# Patient Record
Sex: MALE | Race: BLACK OR AFRICAN AMERICAN | Employment: OTHER | URBAN - METROPOLITAN AREA
[De-identification: names, ages, dates, MRNs, and addresses within clinical notes are randomized per-mention and may not be internally consistent; named-entity substitution may affect disease eponyms.]

---

## 2020-06-30 NOTE — TELEPHONE ENCOUNTER
MEDICAL RECORDS REQUEST   [facility]  Urology Clinic  [address] [place] [address]  PHONE: [phone]  Fax: [phone]        FUTURE VISIT INFORMATION                                                   Parth Dugan, : 1965 scheduled for future visit at [facility]    APPOINTMENT INFORMATION:    Date: 20 9:30AM    Provider:  Faye Snyder PA-C    Reason for Visit/Diagnosis: Testicle pain     REFERRAL INFORMATION:    Referring provider:  Self    Specialty: N/A    Referring providers clinic:  N/A    Clinic contact number:  N/A    RECORDS REQUESTED FOR VISIT                                                     NOTES  STATUS/DETAILS   OFFICE NOTE from referring provider  no   OFFICE NOTE from other specialist  no   DISCHARGE SUMMARY from hospital  no   DISCHARGE REPORT from the ER  no   OPERATIVE REPORT  no   MEDICATION LIST  yes     PRE-VISIT CHECKLIST      Record collection complete Yes- [facility] recs in CE  CSS fax to have imgs pushed     Appointment appropriately scheduled           (right time/right provider) Yes   MyChart activation If no, please explain: In process    Questionnaire complete If no, please explain: In process      Completed by: Leatha Eldridge

## 2020-07-01 ENCOUNTER — PRE VISIT (OUTPATIENT)
Dept: UROLOGY | Facility: CLINIC | Age: 55
End: 2020-07-01

## 2020-07-01 ENCOUNTER — VIRTUAL VISIT (OUTPATIENT)
Dept: UROLOGY | Facility: CLINIC | Age: 55
End: 2020-07-01
Payer: COMMERCIAL

## 2020-07-01 DIAGNOSIS — N50.82 SCROTAL PAIN: Primary | ICD-10-CM

## 2020-07-01 DIAGNOSIS — Z86.79 HISTORY OF VARICOCELE: ICD-10-CM

## 2020-07-01 RX ORDER — COLCHICINE 0.6 MG/1
TABLET ORAL
COMMUNITY

## 2020-07-01 RX ORDER — ALLOPURINOL 100 MG/1
TABLET ORAL
COMMUNITY
Start: 2020-02-19

## 2020-07-01 RX ORDER — OXYCODONE HYDROCHLORIDE 15 MG/1
TABLET ORAL
COMMUNITY
Start: 2019-02-28 | End: 2020-10-27

## 2020-07-01 RX ORDER — OXYCODONE HYDROCHLORIDE 30 MG/1
30 TABLET ORAL
COMMUNITY
Start: 2019-02-28 | End: 2020-10-27

## 2020-07-01 RX ORDER — LOSARTAN POTASSIUM 50 MG/1
50 TABLET ORAL
COMMUNITY
Start: 2020-04-23

## 2020-07-01 RX ORDER — TRIAMTERENE AND HYDROCHLOROTHIAZIDE 37.5; 25 MG/1; MG/1
CAPSULE ORAL
COMMUNITY

## 2020-07-01 RX ORDER — AMLODIPINE BESYLATE 10 MG/1
TABLET ORAL
COMMUNITY

## 2020-07-01 ASSESSMENT — PAIN SCALES - GENERAL: PAINLEVEL: MODERATE PAIN (5)

## 2020-07-01 NOTE — PATIENT INSTRUCTIONS
UROLOGY CLINIC VISIT PATIENT INSTRUCTIONS    Schedule a scrotal ultrasound next available. Next steps pending results.     If you have any issues, questions or concerns in the meantime, do not hesitate to contact us at [phone] or via [employer].     It was a pleasure meeting with you today.  Thank you for allowing me and my team the privilege of caring for you today.  YOU are the reason we are here, and I truly hope we provided you with the excellent service you deserve.  Please let us know if there is anything else we can do for you so that we can be sure you are leaving completely satisfied with your care experience.

## 2020-07-01 NOTE — LETTER
"7/1/2020       RE: Parth Dugan  [address]  [facility] [address]     Dear Colleague,    Thank you for referring your patient, Parth Dugan, to the [facility] UROLOGY AND INST FOR PROSTATE AND UROLOGIC CANCERS at [facility]. Please see a copy of my visit note below.    Parth Dugan is a 55 year old male who is being evaluated via a billable telephone visit.      The patient has been notified of following:     \"This telephone visit will be conducted via a call between you and your physician/provider. We have found that certain health care needs can be provided without the need for a physical exam.  This service lets us provide the care you need with a short phone conversation.  If a prescription is necessary we can send it directly to your pharmacy.  If lab work is needed we can place an order for that and you can then stop by our lab to have the test done at a later time.    Telephone visits are billed at different rates depending on your insurance coverage. During this emergency period, for some insurers they may be billed the same as an in-person visit.  Please reach out to your insurance provider with any questions.    If during the course of the call the physician/provider feels a telephone visit is not appropriate, you will not be charged for this service.\"    Patient has given verbal consent for Telephone visit?  Yes    What phone number would you like to be contacted at? [phone]    How would you like to obtain your AVS? Mail a copy     CC: scrotal pain    HPI: Mr. Parth Dugan is a very pleasant 55 year old male with PMH significant for gout, HTN, GERD, and chronic back pain who is being evaluated via billable telephone visit in consultation from Dr. Gayle for scrotal pain. He describes a long history of testicular pain since the mid-1980's.   This pain is constant but waxes and wanes in severity and seems to be progressively worsening " "over the past several months. States that he saw a urologist in the 1990's who told him that the pain was due to \"playing sports\" and that there was nothing that could be done. He then saw another outside urologist, Dr. Macdonald, in early 2019 and was diagnosed with bilateral varicoceles. He underwent bilateral varicocele embolization with IR on 5/30/2019. Pain seemed to improve temporarily following this procedure but has recurred. He also has a history of chronic low back pain for which he has previously worked with physical therapy.    He denies any associated urinary symptoms such as dysuria, frequency, urgency, hesitancy, intermittency, needing to strain to void, or sensation of incomplete emptying. No gross hematuria or penile discharge. He denies recent injury or trauma to the groin. Saw his PCP recently who treated for possible prostatitis with a course of Doxycycline which patient could not complete secondary to intolerable side effects. No improvement in testicular pain with antibiotics.       No past medical history on file.   Gout  HTN  Chronic back pain    No past surgical history on file.   Reviewed via Care Everywhere    Hydrochlorothiazide; Iodine; Lisinopril; Indomethacin; and Penicillins  Current Outpatient Medications   Medication     allopurinol (ZYLOPRIM) 100 MG tablet     amLODIPine (NORVASC) 10 MG tablet     colchicine (COLCRYS) 0.6 MG tablet     losartan (COZAAR) 50 MG tablet     omeprazole (PRILOSEC) 20 MG DR capsule     oxyCODONE IR (ROXICODONE) 15 MG tablet     oxyCODONE IR (ROXICODONE) 30 MG tablet     triamterene-HCTZ (DYAZIDE) 37.5-25 MG capsule     No current facility-administered medications for this visit.      Family History: There is no h/o  malignancy.  There is no h/o urolithiasis.     Social History: The patient formerly smoked cigarettes (quit in 2002), no EtOH and no illicit drug use.    ROS:  A comprehensive 10 point review of systems was obtained and was negative except for " that outlined above in the HPI.    Imaging:   EXAMINATION: US TESTICLE  DATE:  3/20/2019 10:03 AM  IMPRESSION:  1.  Left-sided varicocele.  2.  Small right hydrocele.       ASSESSMENT/PLAN:  Mr. Parth Dugan is a 55 year old male with chronic scrotal pain s/p bilateral varicocele embolization with IR in May 2019. He had temporary improvement following embolization but pain has recurred. Also has chronic low back pain, which may or may not be connected to scrotal pain (difficult to assess over the phone without the ability to perform physical exam). We discussed possible etiologies including recurrent varicoceles, myofascial or nerve pain, vs other scrotal pathology. Plan is for the following:  -Obtain updated scrotal ultrasound.  -If he has recurrent varicoceles, consider referral to Dr. Trinidad for discussion of possible varicocelectomy vs other management options.  -If scrotal ultrasound is unremarkable, consider referral to pelvic floor physical therapy and/or pain clinic.   -Encouraged scrotal support, NSAIDS PRN, and avoidance of aggravating activities.    Thank you for the opportunity to participate in the care of this very pleasant patient. I will keep you updated on his progress.    Phone call duration: 9 minutes    Faye Snyder PA-C

## 2020-07-01 NOTE — PROGRESS NOTES
"Parth Dugan is a 55 year old male who is being evaluated via a billable telephone visit.      The patient has been notified of following:     \"This telephone visit will be conducted via a call between you and your physician/provider. We have found that certain health care needs can be provided without the need for a physical exam.  This service lets us provide the care you need with a short phone conversation.  If a prescription is necessary we can send it directly to your pharmacy.  If lab work is needed we can place an order for that and you can then stop by our lab to have the test done at a later time.    Telephone visits are billed at different rates depending on your insurance coverage. During this emergency period, for some insurers they may be billed the same as an in-person visit.  Please reach out to your insurance provider with any questions.    If during the course of the call the physician/provider feels a telephone visit is not appropriate, you will not be charged for this service.\"    Patient has given verbal consent for Telephone visit?  Yes    What phone number would you like to be contacted at? [phone]    How would you like to obtain your AVS? Mail a copy     CC: scrotal pain    HPI: Mr. Parth Dugan is a very pleasant 55 year old male with PMH significant for gout, HTN, GERD, and chronic back pain who is being evaluated via billable telephone visit in consultation from Dr. Gayle for scrotal pain. He describes a long history of testicular pain since the mid-1980's. This pain is constant but waxes and wanes in severity and seems to be progressively worsening over the past several months. States that he saw a urologist in the 1990's who told him that the pain was due to \"playing sports\" and that there was nothing that could be done. He then saw another outside urologist, Dr. Macdonald, in early 2019 and was diagnosed with bilateral varicoceles. He underwent bilateral varicocele " embolization with IR on 5/30/2019. Pain seemed to improve temporarily following this procedure but has recurred. He also has a history of chronic low back pain for which he has previously worked with physical therapy.    He denies any associated urinary symptoms such as dysuria, frequency, urgency, hesitancy, intermittency, needing to strain to void, or sensation of incomplete emptying. No gross hematuria or penile discharge. He denies recent injury or trauma to the groin. Saw his PCP recently who treated for possible prostatitis with a course of Doxycycline which patient could not complete secondary to intolerable side effects. No improvement in testicular pain with antibiotics.       No past medical history on file.   Gout  HTN  Chronic back pain    No past surgical history on file.   Reviewed via Care Everywhere    Hydrochlorothiazide; Iodine; Lisinopril; Indomethacin; and Penicillins  Current Outpatient Medications   Medication     allopurinol (ZYLOPRIM) 100 MG tablet     amLODIPine (NORVASC) 10 MG tablet     colchicine (COLCRYS) 0.6 MG tablet     losartan (COZAAR) 50 MG tablet     omeprazole (PRILOSEC) 20 MG DR capsule     oxyCODONE IR (ROXICODONE) 15 MG tablet     oxyCODONE IR (ROXICODONE) 30 MG tablet     triamterene-HCTZ (DYAZIDE) 37.5-25 MG capsule     No current facility-administered medications for this visit.      Family History: There is no h/o  malignancy.  There is no h/o urolithiasis.     Social History: The patient formerly smoked cigarettes (quit in 2002), no EtOH and no illicit drug use.    ROS:  A comprehensive 10 point review of systems was obtained and was negative except for that outlined above in the HPI.    Imaging:   EXAMINATION: US TESTICLE  DATE:  3/20/2019 10:03 AM  IMPRESSION:  1.  Left-sided varicocele.  2.  Small right hydrocele.       ASSESSMENT/PLAN:  Mr. Parth Dugan is a 55 year old male with chronic scrotal pain s/p bilateral varicocele embolization with IR in May 2019. He  had temporary improvement following embolization but pain has recurred. Also has chronic low back pain, which may or may not be connected to scrotal pain (difficult to assess over the phone without the ability to perform physical exam). We discussed possible etiologies including recurrent varicoceles, myofascial or nerve pain, vs other scrotal pathology. Plan is for the following:  -Obtain updated scrotal ultrasound.  -If he has recurrent varicoceles, consider referral to Dr. Trinidad for discussion of possible varicocelectomy vs other management options.  -If scrotal ultrasound is unremarkable, consider referral to pelvic floor physical therapy and/or pain clinic.   -Encouraged scrotal support, NSAIDS PRN, and avoidance of aggravating activities.    Thank you for the opportunity to participate in the care of this very pleasant patient. I will keep you updated on his progress.    Phone call duration: 9 minutes    Faye Snyder PA-C

## 2020-08-24 ENCOUNTER — ANCILLARY PROCEDURE (OUTPATIENT)
Dept: ULTRASOUND IMAGING | Facility: CLINIC | Age: 55
End: 2020-08-24
Attending: PHYSICIAN ASSISTANT
Payer: COMMERCIAL

## 2020-08-24 DIAGNOSIS — N50.82 SCROTAL PAIN: ICD-10-CM

## 2020-08-26 ENCOUNTER — TELEPHONE (OUTPATIENT)
Dept: UROLOGY | Facility: CLINIC | Age: 55
End: 2020-08-26

## 2020-08-26 NOTE — TELEPHONE ENCOUNTER
----- Message from Faye Snyder PA-C sent at 8/25/2020  7:44 AM CDT -----  Please contact patient to schedule next available in person with Dr. Trinidad to discuss scrotal ultrasound results and treatment options for his testicular pain/varicoceles.   Thanks!   Faye Snyder PA-C

## 2020-09-10 ENCOUNTER — PRE VISIT (OUTPATIENT)
Dept: UROLOGY | Facility: CLINIC | Age: 55
End: 2020-09-10

## 2020-09-10 NOTE — TELEPHONE ENCOUNTER
Reason for Visit: Consult    Diagnosis: Varicocele    Orders/Procedures/Records: in system    Contact Patient: n/a    Rooming Requirements: normal      Lydia Nickerson LPN  09/10/20  8:39 AM

## 2020-09-18 ENCOUNTER — OFFICE VISIT (OUTPATIENT)
Dept: UROLOGY | Facility: CLINIC | Age: 55
End: 2020-09-18
Payer: COMMERCIAL

## 2020-09-18 VITALS — HEART RATE: 63 BPM | SYSTOLIC BLOOD PRESSURE: 150 MMHG | DIASTOLIC BLOOD PRESSURE: 99 MMHG

## 2020-09-18 DIAGNOSIS — N50.819 PAIN OF TESTES: ICD-10-CM

## 2020-09-18 DIAGNOSIS — Z11.59 ENCOUNTER FOR SCREENING FOR OTHER VIRAL DISEASES: Primary | ICD-10-CM

## 2020-09-18 DIAGNOSIS — N50.819 PAIN OF TESTES: Primary | ICD-10-CM

## 2020-09-18 DIAGNOSIS — I86.1 BILATERAL VARICOCELES: ICD-10-CM

## 2020-09-18 DIAGNOSIS — R39.198 SLOW URINARY STREAM: Primary | ICD-10-CM

## 2020-09-18 PROBLEM — M1A.09X0 IDIOPATHIC CHRONIC GOUT OF MULTIPLE SITES WITHOUT TOPHUS: Status: ACTIVE | Noted: 2020-02-12

## 2020-09-18 PROBLEM — M1A.40X1: Status: ACTIVE | Noted: 2020-09-18

## 2020-09-18 PROBLEM — G89.4 CHRONIC PAIN DISORDER: Status: ACTIVE | Noted: 2017-09-30

## 2020-09-18 PROBLEM — M94.262 CHONDROMALACIA OF LEFT KNEE: Status: ACTIVE | Noted: 2018-03-14

## 2020-09-18 RX ORDER — KETOCONAZOLE 20 MG/G
CREAM TOPICAL
COMMUNITY
Start: 2020-01-09

## 2020-09-18 RX ORDER — CLINDAMYCIN PHOSPHATE 900 MG/50ML
900 INJECTION, SOLUTION INTRAVENOUS SEE ADMIN INSTRUCTIONS
Status: CANCELLED | OUTPATIENT
Start: 2020-09-18

## 2020-09-18 RX ORDER — LIDOCAINE 50 MG/G
OINTMENT TOPICAL
COMMUNITY
Start: 2020-07-02

## 2020-09-18 RX ORDER — HYDROCORTISONE 2.5 %
CREAM (GRAM) TOPICAL
COMMUNITY
Start: 2019-12-11

## 2020-09-18 RX ORDER — OXYCODONE HYDROCHLORIDE 30 MG/1
TABLET ORAL
COMMUNITY
Start: 2020-08-31 | End: 2020-09-30

## 2020-09-18 RX ORDER — MULTIVITAMIN
1 TABLET ORAL
COMMUNITY

## 2020-09-18 RX ORDER — NALOXONE HYDROCHLORIDE 4 MG/.1ML
SPRAY NASAL
COMMUNITY
Start: 2020-04-01

## 2020-09-18 RX ORDER — CLINDAMYCIN PHOSPHATE 900 MG/50ML
900 INJECTION, SOLUTION INTRAVENOUS
Status: CANCELLED | OUTPATIENT
Start: 2020-09-18

## 2020-09-18 RX ORDER — OXYCODONE HYDROCHLORIDE 5 MG/1
TABLET ORAL
COMMUNITY
Start: 2020-07-19 | End: 2020-10-27

## 2020-09-18 RX ORDER — ATORVASTATIN CALCIUM 20 MG/1
20 TABLET, FILM COATED ORAL DAILY
COMMUNITY
Start: 2020-09-05

## 2020-09-18 RX ORDER — LIDOCAINE 50 MG/G
OINTMENT TOPICAL EVERY 12 HOURS
COMMUNITY
Start: 2020-07-29 | End: 2021-01-24

## 2020-09-18 RX ORDER — TAMSULOSIN HYDROCHLORIDE 0.4 MG/1
0.4 CAPSULE ORAL DAILY
Qty: 90 CAPSULE | Refills: 3 | Status: SHIPPED | OUTPATIENT
Start: 2020-09-18 | End: 2021-04-22

## 2020-09-18 NOTE — PROGRESS NOTES
Parth Dugan is a 55 year old male with history of bilateral testis pain.  Had varicocele embolization 5/30/19 at [facility].  This helped at first but now pain is worse than it was before surgery.  Previous prostate and STI checks have been normal.    Pain used to get better supine or in the AM, now there is no comfortable position.  Complains of sharp scrotal pain.      Scrotal ultrasound 8/24/20 showed persisting bilateral varicoceles.    History of enlarged prostate and has tried antibiotics for prostatitis.  History of Low back pain.    Occasionally will be walking and will buckle over with scrotal pain.    H/o DJD low back- history of bulging disc, etc. he takes oxycodone for his back pain regularly.    Complains of frequent voiding. Taking diuretic.    No history of vasectomy  Scrotal ultrasound 8/24/20 reviewed by me- images and report.  + for bilateral varicoceles..      REVIEW OF SYSTEMS:  General: negative  Skin: negative  Eyes: negative  Ears/Nose/Throat: negative  Respiratory: negative  Cardiovascular: negative  Gastrointestinal: negative  Genitourinary: see HPI  Musculoskeletal: negative  Neurologic: negative  Psychiatric: negative  Hematologic/Lymphatic/Immunologic: negative  Endocrine: negative    PAST MEDICAL HX:  Gout  HTN    PAST SURG HX:  Varicocele embolization 5/2019.  No history of inguinal hernia repair.  Bunion surgery.    FAMILY HX:  No family history on file.    SOCIAL HX:  Social History     Tobacco Use     Smoking status: Former Smoker     Smokeless tobacco: Never Used   Substance Use Topics     Alcohol use: None     Drug use: None       MEDICATIONS:  Current Outpatient Medications   Medication Sig     allopurinol (ZYLOPRIM) 100 MG tablet TAKE 2 TABLETS (200 MG) BY MOUTH ONCE DAILY.     amLODIPine (NORVASC) 10 MG tablet TAKE 1 TABLET (10 MG) BY MOUTH ONCE DAILY.     atorvastatin (LIPITOR) 20 MG tablet Take 20 mg by mouth daily     colchicine (COLCRYS) 0.6 MG tablet 1 tablet      hydrocortisone 2.5 % cream MIX 1:1 WITH KETOCONAZOLE CREAM AND APPLY TO AFFECTED AREAS 2 TIMES A DAY FOR UP TO 14 DAYS     ketoconazole (NIZORAL) 2 % external cream MIX 1:1 WITH HYDROCORTISONE AND APPLY 2 TIMES A DAY TO AFFECTED AREAS FOR UP TO 14 DAYS     lidocaine (XYLOCAINE) 5 % external ointment apply 5 gram by topical route 2 times every day     lidocaine (XYLOCAINE) 5 % external ointment Apply topically every 12 hours     losartan (COZAAR) 50 MG tablet Take 50 mg by mouth     Multiple Vitamin (DAILY VITAMINS) TABS Take 1 tablet by mouth     NARCAN 4 MG/0.1ML nasal spray spray 0.1 milliliter by intranasal route in 1 nostril may repeat dose every 2-3 minutes as needed alternating nostrils with each dose     omeprazole (PRILOSEC) 20 MG DR capsule TAKE 2 CAPSULES BY MOUTH EVERY DAY     oxyCODONE (ROXICODONE) 5 MG tablet      oxyCODONE IR (ROXICODONE) 15 MG tablet (Schedule II Drug) take 1 tablet by oral route  every 4-6 hours as needed for chronic pain, max 2/day     oxyCODONE IR (ROXICODONE) 30 MG tablet take 1 tablet by oral route  every 8 hours for chronic pain     oxyCODONE IR (ROXICODONE) 30 MG tablet Take 30 mg by mouth     triamterene-HCTZ (DYAZIDE) 37.5-25 MG capsule TAKE 1 CAPSULE BY MOUTH EVERY DAY     No current facility-administered medications for this visit.        ALLERGIES:  Hydrochlorothiazide; Iodine; Lisinopril; Amoxicillin; Indomethacin; and Penicillins      GENERAL PHYSICAL EXAM:     BP (!) 150/99   Pulse 63    Constitutional: No acute distress. Well nourished.   PSYCH: normal mood and affect.  NEURO: normal gait, no focal deficits.   EYES: anicteric, EOMI, PERR.  CARDIOPULMONARY: breathing non-labored, pulse regular rate/rhythm, no peripheral edema.  GI: Abdomen soft, non-tender,  Nondistended   MUSCULOSKELETAL: normal limb proportions, no muscle wasting, no contractures.  SKIN: Normal virilized hair distribution, no lesions, warts or rashes over genitalia, abdomen extremities or  face.  HEME/LYMPH: no ecchymosis, no lymphadenopathy in groin, no lymphedema.     EXAM:  Phallus normal   Left testis descended , size is normal  , consistency is normal . No intra-testicular masses.   Right testis descended , size is normal  , consistency is normal . No intra-testicular masses.   Epididymes present, non-tender, not-enlarged.   Left cord: Vas present.  Probable varicocele .  Right cord: Vas present.  Probable varicocele .     Prostate exam: Deferred    Imaging/labs:  Reviewed scrotal ultrasound 8/24/20     ASSESSMENT:     Bilateral testis pain.  I discussed with him pain like this can be multifactorial.  He could be a chronic neuropathic pain that would not get better or could possibly get slightly worse with varicocele surgery.  Varicocele repair seems very reasonable, to eliminate these as a possible source of his pain.  We discussed options to varicocele repair including pain clinic.  He would like to proceed with repairing the varicoceles.    PLAN:    Discussed risks, benefits, and alternatives of varicocele repair, including various methods.  I counseled him extensively on the nature of varicoceles, and their possible effects on pain, fertility, and testosterone production.  I described surgery and embolization approaches, and the detailed risks of surgical repair.  These include damage to artery (ischemia), damage to lymphatics ( hydrocele), as well as risk of recurrence (~1%). Discussed that about 2/3rds of men see improved semen parameters after varicocele repair and that improvement takes at least 3 months to see.  Discussed that varicocele pain typically improves with repair, but in rare cases the testis can become sensitive after a surgical repair.     He would like to schedule bilateral varicocele repair and we will get this scheduled at his convenience      Copied cc to Consulting provider Faye Snyder       Thank-you for the kind consultation.  Kennedy Trinidad MD     Urological  Surgeon     20 min visit, over 50% face to face in counseling/discussion of options for management of testis pain.

## 2020-09-18 NOTE — PROGRESS NOTES
Pre Op Teaching Flowsheet       Pre and Post op Patient Education  Relevant Diagnosis:  Pain of testes and Bilateral varicoceles  Surgical procedure:  BILATERAL VARICOCELE REPAIR   Teaching Topic:  Pre and post op teaching  Person Involved in teaching: Parth Dugan    Motivation Level:  Asks Questions: Yes  Eager to Learn:  Yes  Cooperative: Yes  Receptive (willing/able to accept information):  Yes    Patient demonstrates understanding of the following:  Date of surgery:  10/27/20  Location of surgery:  [facility] [place]- 5th Floor  History and Physical and any other testing necessary prior to surgery: Yes-primary  Required time line for completion of History and Physical and any pre-op testing: Yes    Patient demonstrates understanding of the following:  Pre-op bowel prep:  None  Pre-op showering/scrub information with PCMX Soap: Yes  Blood thinner medications discussed and when to stop (if applicable):  Yes      Infection Prevention:   Patient demonstrates understanding of the following:  Surgical procedure site care taught: at time of discharge  Signs and symptoms of infection taught:  Yes      Post-op follow-up:  Discussed how to contact the hospital, nurse, and clinic scheduling staff if necessary.    Instructional materials used/given/mailed:  [place] [facility] Booklet, post op teaching sheet, Map, Soap, and arrival/location information.    Surgical instructions packet given to patient in office:  Yes.    Patient has a  and someone to stay with him for the first 24 hours.    Patient is aware he needs COVID testing prior to procedure.

## 2020-09-18 NOTE — LETTER
9/18/2020       RE: Parth Dugan  [address]  [facility] [address]     Dear Colleague,    Thank you for referring your patient, Parth Dugan, to the [facility] UROLOGY AND INST FOR PROSTATE AND UROLOGIC CANCERS at [facility]. Please see a copy of my visit note below.    Parth Dugan is a 55 year old male with history of bilateral testis pain.  Had varicocele embolization 5/30/19 at [facility].  This helped at first but now pain is worse than it was before surgery.  Previous prostate and STI checks have been normal.    Pain used to get better supine or in the AM, now there is no comfortable position.  Complains of sharp scrotal pain.      Scrotal ultrasound 8/24/20 showed persisting bilateral varicoceles.    History of enlarged prostate and has tried antibiotics for prostatitis.  History of Low back pain.    Occasionally will be walking and will buckle over with scrotal pain.    H/o DJD low back- history of bulging disc, etc. he takes oxycodone for his back pain regularly.    Complains of frequent voiding. Taking diuretic.    No history of vasectomy  Scrotal ultrasound 8/24/20 reviewed by me- images and report.  + for bilateral varicoceles..      REVIEW OF SYSTEMS:  General: negative  Skin: negative  Eyes: negative  Ears/Nose/Throat: negative  Respiratory: negative  Cardiovascular: negative  Gastrointestinal: negative  Genitourinary: see HPI  Musculoskeletal: negative  Neurologic: negative  Psychiatric: negative  Hematologic/Lymphatic/Immunologic: negative  Endocrine: negative    PAST MEDICAL HX:  Gout  HTN    PAST SURG HX:  Varicocele embolization 5/2019.  No history of inguinal hernia repair.  Bunion surgery.    FAMILY HX:  No family history on file.    SOCIAL HX:  Social History     Tobacco Use     Smoking status: Former Smoker     Smokeless tobacco: Never Used   Substance Use Topics     Alcohol use: None     Drug use: None       MEDICATIONS:  Current  Outpatient Medications   Medication Sig     allopurinol (ZYLOPRIM) 100 MG tablet TAKE 2 TABLETS (200 MG) BY MOUTH ONCE DAILY.     amLODIPine (NORVASC) 10 MG tablet TAKE 1 TABLET (10 MG) BY MOUTH ONCE DAILY.     atorvastatin (LIPITOR) 20 MG tablet Take 20 mg by mouth daily     colchicine (COLCRYS) 0.6 MG tablet 1 tablet     hydrocortisone 2.5 % cream MIX 1:1 WITH KETOCONAZOLE CREAM AND APPLY TO AFFECTED AREAS 2 TIMES A DAY FOR UP TO 14 DAYS     ketoconazole (NIZORAL) 2 % external cream MIX 1:1 WITH HYDROCORTISONE AND APPLY 2 TIMES A DAY TO AFFECTED AREAS FOR UP TO 14 DAYS     lidocaine (XYLOCAINE) 5 % external ointment apply 5 gram by topical route 2 times every day     lidocaine (XYLOCAINE) 5 % external ointment Apply topically every 12 hours     losartan (COZAAR) 50 MG tablet Take 50 mg by mouth     Multiple Vitamin (DAILY VITAMINS) TABS Take 1 tablet by mouth     NARCAN 4 MG/0.1ML nasal spray spray 0.1 milliliter by intranasal route in 1 nostril may repeat dose every 2-3 minutes as needed alternating nostrils with each dose     omeprazole (PRILOSEC) 20 MG DR capsule TAKE 2 CAPSULES BY MOUTH EVERY DAY     oxyCODONE (ROXICODONE) 5 MG tablet      oxyCODONE IR (ROXICODONE) 15 MG tablet (Schedule II Drug) take 1 tablet by oral route  every 4-6 hours as needed for chronic pain, max 2/day     oxyCODONE IR (ROXICODONE) 30 MG tablet take 1 tablet by oral route  every 8 hours for chronic pain     oxyCODONE IR (ROXICODONE) 30 MG tablet Take 30 mg by mouth     triamterene-HCTZ (DYAZIDE) 37.5-25 MG capsule TAKE 1 CAPSULE BY MOUTH EVERY DAY     No current facility-administered medications for this visit.        ALLERGIES:  Hydrochlorothiazide; Iodine; Lisinopril; Amoxicillin; Indomethacin; and Penicillins      GENERAL PHYSICAL EXAM:     BP (!) 150/99   Pulse 63    Constitutional: No acute distress. Well nourished.   PSYCH: normal mood and affect.  NEURO: normal gait, no focal deficits.   EYES: anicteric, EOMI,  PERR.  CARDIOPULMONARY: breathing non-labored, pulse regular rate/rhythm, no peripheral edema.  GI: Abdomen soft, non-tender,  Nondistended   MUSCULOSKELETAL: normal limb proportions, no muscle wasting, no contractures.  SKIN: Normal virilized hair distribution, no lesions, warts or rashes over genitalia, abdomen extremities or face.  HEME/LYMPH: no ecchymosis, no lymphadenopathy in groin, no lymphedema.     EXAM:  Phallus normal   Left testis descended , size is normal  , consistency is normal . No intra-testicular masses.   Right testis descended , size is normal  , consistency is normal . No intra-testicular masses.   Epididymes present, non-tender, not-enlarged.   Left cord: Vas present.  Probable varicocele .  Right cord: Vas present.  Probable varicocele .     Prostate exam: Deferred    Imaging/labs:  Reviewed scrotal ultrasound 8/24/20     ASSESSMENT:     Bilateral testis pain.  I discussed with him pain like this can be multifactorial.  He could be a chronic neuropathic pain that would not get better or could possibly get slightly worse with varicocele surgery.  Varicocele repair seems very reasonable, to eliminate these as a possible source of his pain.  We discussed options to varicocele repair including pain clinic.  He would like to proceed with repairing the varicoceles.    PLAN:    Discussed risks, benefits, and alternatives of varicocele repair, including various methods.  I counseled him extensively on the nature of varicoceles, and their possible effects on pain, fertility, and testosterone production.  I described surgery and embolization approaches, and the detailed risks of surgical repair.  These include damage to artery (ischemia), damage to lymphatics ( hydrocele), as well as risk of recurrence (~1%). Discussed that about 2/3rds of men see improved semen parameters after varicocele repair and that improvement takes at least 3 months to see.  Discussed that varicocele pain typically improves  with repair, but in rare cases the testis can become sensitive after a surgical repair.     He would like to schedule bilateral varicocele repair and we will get this scheduled at his convenience      Copied cc to Consulting provider Faye Snyder       Thank-you for the kind consultation.  Kennedy Trinidad MD     Urological Surgeon     20 min visit, over 50% face to face in counseling/discussion of options for management of testis pain.

## 2020-09-25 ENCOUNTER — TRANSFERRED RECORDS (OUTPATIENT)
Dept: HEALTH INFORMATION MANAGEMENT | Facility: CLINIC | Age: 55
End: 2020-09-25

## 2020-09-25 ASSESSMENT — MIFFLIN-ST. JEOR: SCORE: 2091.84

## 2020-10-23 DIAGNOSIS — Z11.59 ENCOUNTER FOR SCREENING FOR OTHER VIRAL DISEASES: ICD-10-CM

## 2020-10-23 PROCEDURE — 99000 SPECIMEN HANDLING OFFICE-LAB: CPT | Performed by: PATHOLOGY

## 2020-10-23 PROCEDURE — U0003 INFECTIOUS AGENT DETECTION BY NUCLEIC ACID (DNA OR RNA); SEVERE ACUTE RESPIRATORY SYNDROME CORONAVIRUS 2 (SARS-COV-2) (CORONAVIRUS DISEASE [COVID-19]), AMPLIFIED PROBE TECHNIQUE, MAKING USE OF HIGH THROUGHPUT TECHNOLOGIES AS DESCRIBED BY CMS-2020-01-R: HCPCS | Mod: 90 | Performed by: PATHOLOGY

## 2020-10-24 LAB
SARS-COV-2 RNA SPEC QL NAA+PROBE: NOT DETECTED
SPECIMEN SOURCE: NORMAL

## 2020-10-26 ENCOUNTER — ANESTHESIA EVENT (OUTPATIENT)
Dept: SURGERY | Facility: AMBULATORY SURGERY CENTER | Age: 55
End: 2020-10-26

## 2020-10-27 ENCOUNTER — ANESTHESIA (OUTPATIENT)
Dept: SURGERY | Facility: AMBULATORY SURGERY CENTER | Age: 55
End: 2020-10-27

## 2020-10-27 ENCOUNTER — HOSPITAL ENCOUNTER (OUTPATIENT)
Facility: AMBULATORY SURGERY CENTER | Age: 55
Discharge: HOME | End: 2020-10-27
Attending: UROLOGY | Admitting: UROLOGY
Payer: COMMERCIAL

## 2020-10-27 VITALS
TEMPERATURE: 97.5 F | BODY MASS INDEX: 37.38 KG/M2 | WEIGHT: 276 LBS | RESPIRATION RATE: 16 BRPM | HEART RATE: 77 BPM | DIASTOLIC BLOOD PRESSURE: 86 MMHG | OXYGEN SATURATION: 95 % | HEIGHT: 72 IN | SYSTOLIC BLOOD PRESSURE: 131 MMHG

## 2020-10-27 DIAGNOSIS — I86.1 BILATERAL VARICOCELES: ICD-10-CM

## 2020-10-27 DIAGNOSIS — N50.819 PAIN OF TESTES: ICD-10-CM

## 2020-10-27 PROCEDURE — 55530 REVISE SPERMATIC CORD VEINS: CPT | Mod: 50 | Performed by: UROLOGY

## 2020-10-27 PROCEDURE — 55530 REVISE SPERMATIC CORD VEINS: CPT | Mod: 50

## 2020-10-27 RX ORDER — FENTANYL CITRATE 50 UG/ML
25-50 INJECTION, SOLUTION INTRAMUSCULAR; INTRAVENOUS
Status: DISCONTINUED | OUTPATIENT
Start: 2020-10-27 | End: 2020-10-28 | Stop reason: HOSPADM

## 2020-10-27 RX ORDER — FENTANYL CITRATE 50 UG/ML
INJECTION, SOLUTION INTRAMUSCULAR; INTRAVENOUS PRN
Status: DISCONTINUED | OUTPATIENT
Start: 2020-10-27 | End: 2020-10-27

## 2020-10-27 RX ORDER — PROPOFOL 10 MG/ML
INJECTION, EMULSION INTRAVENOUS PRN
Status: DISCONTINUED | OUTPATIENT
Start: 2020-10-27 | End: 2020-10-27

## 2020-10-27 RX ORDER — DEXAMETHASONE SODIUM PHOSPHATE 4 MG/ML
INJECTION, SOLUTION INTRA-ARTICULAR; INTRALESIONAL; INTRAMUSCULAR; INTRAVENOUS; SOFT TISSUE PRN
Status: DISCONTINUED | OUTPATIENT
Start: 2020-10-27 | End: 2020-10-27

## 2020-10-27 RX ORDER — SENNA AND DOCUSATE SODIUM 50; 8.6 MG/1; MG/1
1 TABLET, FILM COATED ORAL 2 TIMES DAILY
Qty: 39 TABLET | Refills: 0 | Status: SHIPPED | OUTPATIENT
Start: 2020-10-27

## 2020-10-27 RX ORDER — LIDOCAINE HYDROCHLORIDE 20 MG/ML
INJECTION, SOLUTION INFILTRATION; PERINEURAL PRN
Status: DISCONTINUED | OUTPATIENT
Start: 2020-10-27 | End: 2020-10-27

## 2020-10-27 RX ORDER — NALOXONE HYDROCHLORIDE 0.4 MG/ML
.1-.4 INJECTION, SOLUTION INTRAMUSCULAR; INTRAVENOUS; SUBCUTANEOUS
Status: DISCONTINUED | OUTPATIENT
Start: 2020-10-27 | End: 2020-10-28 | Stop reason: HOSPADM

## 2020-10-27 RX ORDER — BUPIVACAINE HYDROCHLORIDE 5 MG/ML
INJECTION, SOLUTION PERINEURAL PRN
Status: DISCONTINUED | OUTPATIENT
Start: 2020-10-27 | End: 2020-10-27 | Stop reason: HOSPADM

## 2020-10-27 RX ORDER — LIDOCAINE 40 MG/G
CREAM TOPICAL
Status: DISCONTINUED | OUTPATIENT
Start: 2020-10-27 | End: 2020-10-27 | Stop reason: HOSPADM

## 2020-10-27 RX ORDER — DEXAMETHASONE SODIUM PHOSPHATE 4 MG/ML
4 INJECTION, SOLUTION INTRA-ARTICULAR; INTRALESIONAL; INTRAMUSCULAR; INTRAVENOUS; SOFT TISSUE EVERY 10 MIN PRN
Status: DISCONTINUED | OUTPATIENT
Start: 2020-10-27 | End: 2020-10-28 | Stop reason: HOSPADM

## 2020-10-27 RX ORDER — SODIUM CHLORIDE, SODIUM LACTATE, POTASSIUM CHLORIDE, CALCIUM CHLORIDE 600; 310; 30; 20 MG/100ML; MG/100ML; MG/100ML; MG/100ML
INJECTION, SOLUTION INTRAVENOUS CONTINUOUS
Status: DISCONTINUED | OUTPATIENT
Start: 2020-10-27 | End: 2020-10-27 | Stop reason: HOSPADM

## 2020-10-27 RX ORDER — ALBUTEROL SULFATE 0.83 MG/ML
2.5 SOLUTION RESPIRATORY (INHALATION) EVERY 4 HOURS PRN
Status: DISCONTINUED | OUTPATIENT
Start: 2020-10-27 | End: 2020-10-27 | Stop reason: HOSPADM

## 2020-10-27 RX ORDER — KETAMINE HYDROCHLORIDE 10 MG/ML
INJECTION INTRAMUSCULAR; INTRAVENOUS PRN
Status: DISCONTINUED | OUTPATIENT
Start: 2020-10-27 | End: 2020-10-27

## 2020-10-27 RX ORDER — MEPERIDINE HYDROCHLORIDE 25 MG/ML
12.5 INJECTION INTRAMUSCULAR; INTRAVENOUS; SUBCUTANEOUS
Status: DISCONTINUED | OUTPATIENT
Start: 2020-10-27 | End: 2020-10-28 | Stop reason: HOSPADM

## 2020-10-27 RX ORDER — MIDAZOLAM HYDROCHLORIDE 5 MG/ML
INJECTION, SOLUTION INTRAMUSCULAR; INTRAVENOUS PRN
Status: DISCONTINUED | OUTPATIENT
Start: 2020-10-27 | End: 2020-10-27

## 2020-10-27 RX ORDER — CLINDAMYCIN PHOSPHATE 900 MG/50ML
900 INJECTION, SOLUTION INTRAVENOUS
Status: DISCONTINUED | OUTPATIENT
Start: 2020-10-27 | End: 2020-10-27 | Stop reason: HOSPADM

## 2020-10-27 RX ORDER — OXYCODONE HYDROCHLORIDE 5 MG/1
5 TABLET ORAL EVERY 6 HOURS PRN
Qty: 15 TABLET | Refills: 0 | Status: SHIPPED | OUTPATIENT
Start: 2020-10-27

## 2020-10-27 RX ORDER — OXYCODONE HYDROCHLORIDE 5 MG/1
5-10 TABLET ORAL EVERY 4 HOURS PRN
Status: DISCONTINUED | OUTPATIENT
Start: 2020-10-27 | End: 2020-10-28 | Stop reason: HOSPADM

## 2020-10-27 RX ORDER — GABAPENTIN 300 MG/1
300 CAPSULE ORAL ONCE
Status: DISCONTINUED | OUTPATIENT
Start: 2020-10-27 | End: 2020-10-27 | Stop reason: HOSPADM

## 2020-10-27 RX ORDER — HYDROMORPHONE HYDROCHLORIDE 1 MG/ML
.3-.5 INJECTION, SOLUTION INTRAMUSCULAR; INTRAVENOUS; SUBCUTANEOUS EVERY 10 MIN PRN
Status: DISCONTINUED | OUTPATIENT
Start: 2020-10-27 | End: 2020-10-28 | Stop reason: HOSPADM

## 2020-10-27 RX ORDER — PROPOFOL 10 MG/ML
INJECTION, EMULSION INTRAVENOUS CONTINUOUS PRN
Status: DISCONTINUED | OUTPATIENT
Start: 2020-10-27 | End: 2020-10-27

## 2020-10-27 RX ORDER — PAPAVERINE HYDROCHLORIDE 30 MG/ML
INJECTION INTRAMUSCULAR; INTRAVENOUS PRN
Status: DISCONTINUED | OUTPATIENT
Start: 2020-10-27 | End: 2020-10-27 | Stop reason: HOSPADM

## 2020-10-27 RX ORDER — SODIUM CHLORIDE, SODIUM LACTATE, POTASSIUM CHLORIDE, CALCIUM CHLORIDE 600; 310; 30; 20 MG/100ML; MG/100ML; MG/100ML; MG/100ML
INJECTION, SOLUTION INTRAVENOUS CONTINUOUS
Status: DISCONTINUED | OUTPATIENT
Start: 2020-10-27 | End: 2020-10-28 | Stop reason: HOSPADM

## 2020-10-27 RX ORDER — ONDANSETRON 4 MG/1
4 TABLET, ORALLY DISINTEGRATING ORAL EVERY 30 MIN PRN
Status: DISCONTINUED | OUTPATIENT
Start: 2020-10-27 | End: 2020-10-28 | Stop reason: HOSPADM

## 2020-10-27 RX ORDER — KETOROLAC TROMETHAMINE 30 MG/ML
30 INJECTION, SOLUTION INTRAMUSCULAR; INTRAVENOUS EVERY 6 HOURS PRN
Status: DISCONTINUED | OUTPATIENT
Start: 2020-10-27 | End: 2020-10-28 | Stop reason: HOSPADM

## 2020-10-27 RX ORDER — ONDANSETRON 2 MG/ML
INJECTION INTRAMUSCULAR; INTRAVENOUS PRN
Status: DISCONTINUED | OUTPATIENT
Start: 2020-10-27 | End: 2020-10-27

## 2020-10-27 RX ORDER — ACETAMINOPHEN 325 MG/1
975 TABLET ORAL ONCE
Status: DISCONTINUED | OUTPATIENT
Start: 2020-10-27 | End: 2020-10-27 | Stop reason: HOSPADM

## 2020-10-27 RX ORDER — FENTANYL CITRATE 50 UG/ML
25-50 INJECTION, SOLUTION INTRAMUSCULAR; INTRAVENOUS
Status: DISCONTINUED | OUTPATIENT
Start: 2020-10-27 | End: 2020-10-27 | Stop reason: HOSPADM

## 2020-10-27 RX ORDER — CLINDAMYCIN PHOSPHATE 900 MG/50ML
900 INJECTION, SOLUTION INTRAVENOUS SEE ADMIN INSTRUCTIONS
Status: DISCONTINUED | OUTPATIENT
Start: 2020-10-27 | End: 2020-10-27 | Stop reason: HOSPADM

## 2020-10-27 RX ORDER — ONDANSETRON 2 MG/ML
4 INJECTION INTRAMUSCULAR; INTRAVENOUS EVERY 30 MIN PRN
Status: DISCONTINUED | OUTPATIENT
Start: 2020-10-27 | End: 2020-10-28 | Stop reason: HOSPADM

## 2020-10-27 RX ORDER — EPHEDRINE SULFATE 50 MG/ML
INJECTION, SOLUTION INTRAMUSCULAR; INTRAVENOUS; SUBCUTANEOUS PRN
Status: DISCONTINUED | OUTPATIENT
Start: 2020-10-27 | End: 2020-10-27

## 2020-10-27 RX ADMIN — EPHEDRINE SULFATE 5 MG: 50 INJECTION, SOLUTION INTRAMUSCULAR; INTRAVENOUS; SUBCUTANEOUS at 08:43

## 2020-10-27 RX ADMIN — SODIUM CHLORIDE, SODIUM LACTATE, POTASSIUM CHLORIDE, CALCIUM CHLORIDE: 600; 310; 30; 20 INJECTION, SOLUTION INTRAVENOUS at 11:22

## 2020-10-27 RX ADMIN — Medication 100 MCG: at 09:04

## 2020-10-27 RX ADMIN — Medication 100 MCG: at 09:33

## 2020-10-27 RX ADMIN — Medication 100 MCG: at 09:13

## 2020-10-27 RX ADMIN — CLINDAMYCIN PHOSPHATE 900 MG: 900 INJECTION, SOLUTION INTRAVENOUS at 08:20

## 2020-10-27 RX ADMIN — KETAMINE HYDROCHLORIDE 30 MG: 10 INJECTION INTRAMUSCULAR; INTRAVENOUS at 08:25

## 2020-10-27 RX ADMIN — Medication 0.5 MG: at 08:37

## 2020-10-27 RX ADMIN — FENTANYL CITRATE 50 MCG: 50 INJECTION, SOLUTION INTRAMUSCULAR; INTRAVENOUS at 08:09

## 2020-10-27 RX ADMIN — EPHEDRINE SULFATE 5 MG: 50 INJECTION, SOLUTION INTRAMUSCULAR; INTRAVENOUS; SUBCUTANEOUS at 09:33

## 2020-10-27 RX ADMIN — Medication 20 MG: at 08:35

## 2020-10-27 RX ADMIN — Medication 100 MCG: at 08:57

## 2020-10-27 RX ADMIN — PROPOFOL: 10 INJECTION, EMULSION INTRAVENOUS at 09:17

## 2020-10-27 RX ADMIN — PROPOFOL 200 MG: 10 INJECTION, EMULSION INTRAVENOUS at 08:09

## 2020-10-27 RX ADMIN — LIDOCAINE HYDROCHLORIDE 100 MG: 20 INJECTION, SOLUTION INFILTRATION; PERINEURAL at 08:09

## 2020-10-27 RX ADMIN — ONDANSETRON 4 MG: 2 INJECTION INTRAMUSCULAR; INTRAVENOUS at 08:25

## 2020-10-27 RX ADMIN — DEXAMETHASONE SODIUM PHOSPHATE 4 MG: 4 INJECTION, SOLUTION INTRA-ARTICULAR; INTRALESIONAL; INTRAMUSCULAR; INTRAVENOUS; SOFT TISSUE at 08:09

## 2020-10-27 RX ADMIN — SODIUM CHLORIDE, SODIUM LACTATE, POTASSIUM CHLORIDE, CALCIUM CHLORIDE: 600; 310; 30; 20 INJECTION, SOLUTION INTRAVENOUS at 07:07

## 2020-10-27 RX ADMIN — PROPOFOL 70 MG: 10 INJECTION, EMULSION INTRAVENOUS at 08:10

## 2020-10-27 RX ADMIN — MIDAZOLAM HYDROCHLORIDE 2 MG: 5 INJECTION, SOLUTION INTRAMUSCULAR; INTRAVENOUS at 08:02

## 2020-10-27 RX ADMIN — PROPOFOL 150 MCG/KG/MIN: 10 INJECTION, EMULSION INTRAVENOUS at 08:10

## 2020-10-27 RX ADMIN — FENTANYL CITRATE 50 MCG: 50 INJECTION, SOLUTION INTRAMUSCULAR; INTRAVENOUS at 08:10

## 2020-10-27 RX ADMIN — EPHEDRINE SULFATE 5 MG: 50 INJECTION, SOLUTION INTRAMUSCULAR; INTRAVENOUS; SUBCUTANEOUS at 08:45

## 2020-10-27 RX ADMIN — Medication 100 MCG: at 09:19

## 2020-10-27 ASSESSMENT — LIFESTYLE VARIABLES: TOBACCO_USE: 1

## 2020-10-27 ASSESSMENT — MIFFLIN-ST. JEOR: SCORE: 2134.93

## 2020-10-27 NOTE — OP NOTE
+OPERATIVE REPORT    PREOPERATIVE DIAGNOSIS: Bilateral varicocele(s).  POSTOPERATIVE DIAGNOSIS: Same as above    PROCEDURE PERFORMED: Bilateral varicocelectomy using microscope.     STAFF SURGEON: Kennedy Trinidad MD    RESIDENT SURGEON:  Maryjane Ruiz MD    ANESTHESIA: General endotracheal.     ESTIMATED BLOOD LOSS: 1 mL.    SPECIMENS: None.     COMPLICATIONS: None apparent     SIGNIFICANT FINDINGS:   Left side: Spared 2 arteries, 7 lymphatic channels  Right side: Spared 3 arteries, 10 lymphatic channels  Vas and deferential vessels spared bilaterally      INDICATIONS FOR PROCEDURE: Parth Dugan is a(n) 55 year old gentleman who presented for evaluation of chronic scrotal pain. He was found to have an abnormal semen analysis and bilateral varicoceles and has elected for repair.  The risks, benefits, alternatives were discussed with the patient and he was consented on an elective basis to have this done.  He does have a lot of chronic pain issues, possibly stemming from DJD in his back.  He understands that the varicocele repair may not relieve all components of his discomfort.    DESCRIPTION OF PROCEDURE: After obtaining informed consent, properly marking and identifying the patient in the preoperative area, he was brought to the operating room, placed on the operating room table in the supine position. General anesthesia was induced. Pneumoboots and preoperative antibiotics were administered. The patient was subsequently shaved, prepped and draped in the standard sterile fashion. A timeout was performed verifying the patient and procedure prior to beginning.     We began the procedure by marking a 3 cm subinguinal incision on the right side below the external ring. Injection of 0.5% Marcaine was used for local anesthesia before incision.  Electrocautery was used to carry dissection down to daniel's fascia which was subsequently opened bluntly using the Metzenbaum scissors, exposing the cord. Blunt  dissection was continued proximally and distally to free up the cord and bring it up to the incision. The prepubic area and tissue surrounding the spermatic cord were examined carefully looking for any additional vessels or veins. None were found and the cord was looped with a Penrose drain. The same procedure was carried out on the left side.  The Microscope was then brought into the field.      We began on the left. Spermatic fascia and cremasteric muscle fibers were opened using blunt dissection and electrocautery in the direction of the cord. We noted that the tissue appeared particularly dense/adherent. Bundles of the cremasteric fibers were isolated laterally and medially with vessel loops and were pulled on the respective directions to aid in retraction of the cord and expose the vascular elements. Next, the vas deferens and its associated vessels and lymphatics were identified and preserved and again marked using a vessel loop. We then used a Doppler, 20 Hz probe to identify areas of arterial flow. Within the cord structures themselves, we were able to identify and preserve 2 arteries.  We also successfully identified 7 lymphatic channels that were preserved.  The remaining veins within the cord were ligated using 4-0 silk ties. The cremasteric muscle fibers were subsequently also ligated and divided using 2-0 silk ties. Of note, one section of cremasteric muscle fibers was doubly ligated but not ultimately divided in order to support the testicle. Following this, hemostasis was obtained using bipolar electrocautery and excellent hemostasis was noted. The cord was injected with 0.5% Marcaine and placed back into the left prepubic area. At this point a bowers catheter was placed.  Clear urine returned.    The procedure as stated above was then repeated on the right side. Within the cord structures themselves, we were able to identify and preserve 3 arteries.  We also successfully identified 10 lymphatic  channels that were preserved.  After injection of additional 0.5% Marcaine, the incisions were then closed beginning with closure of Fara's fascia in a deep dermal fashion using interrupted 3-0 chromic sutures. The skin was then closed with a 4-0 Monocryl in a running subcuticular fashion. Steri-Strips were placed. Primapore dressing applied.  The bowers was removed at this time. The patient was awakened from anesthesia and brought to the PACU in stable condition. The patient tolerated the procedure well and no intraoperative complications were noted.     Plan Discharge when meeting PACU criteria. Follow up 12/4 with Dr. Amos COLLINS was present and scrubbed for the entire procedure.  Uncomplicated bilateral varicocele repair.  Kennedy Trinidad MD  Urology Staff

## 2020-10-27 NOTE — DISCHARGE INSTRUCTIONS
[facility] Ambulatory Surgery and Procedure Center  Home Care Following Anesthesia  For 24 hours after surgery:  1. Get plenty of rest.  A responsible adult must stay with you for at least 24 hours after you leave the surgery center.  2. Do not drive or use heavy equipment.  If you have weakness or tingling, don't drive or use heavy equipment until this feeling goes away.   3. Do not drink alcohol.   4. Avoid strenuous or risky activities.  Ask for help when climbing stairs.  5. You may feel lightheaded.  IF so, sit for a few minutes before standing.  Have someone help you get up.   6. If you have nausea (feel sick to your stomach): Drink only clear liquids such as apple juice, ginger ale, broth or 7-Up.  Rest may also help.  Be sure to drink enough fluids.  Move to a regular diet as you feel able.   7. You may have a slight fever.  Call the doctor if your fever is over 100 F (37.7 C) (taken under the tongue) or lasts longer than 24 hours.  8. You may have a dry mouth, a sore throat, muscle aches or trouble sleeping. These should go away after 24 hours.  9. Do not make important or legal decisions.               Tips for taking pain medications  To get the best pain relief possible, remember these points:    Take pain medications as directed, before pain becomes severe.    Pain medication can upset your stomach: taking it with food may help.    Constipation is a common side effect of pain medication. Drink plenty of  fluids.    Eat foods high in fiber. Take a stool softener if recommended by your doctor or pharmacist.    Do not drink alcohol, drive or operate machinery while taking pain medications.    Ask about other ways to control pain, such as with heat, ice or relaxation.    Tylenol/Acetaminophen Consumption  To help encourage the safe use of acetaminophen, the makers of TYLENOL  have lowered the maximum daily dose for single-ingredient Extra Strength TYLENOL  (acetaminophen) products sold in the [place] from 8  pills per day (4,000 mg) to 6 pills per day (3,000 mg). The dosing interval has also changed from 2 pills every 4-6 hours to 2 pills every 6 hours.    If you feel your pain relief is insufficient, you may take Tylenol/Acetaminophen in addition to your narcotic pain medication.     Be careful not to exceed 3,000 mg of Tylenol/Acetaminophen in a 24 hour period from all sources.    If you are taking extra strength Tylenol/acetaminophen (500 mg), the maximum dose is 6 tablets in 24 hours.    If you are taking regular strength acetaminophen (325 mg), the maximum dose is 9 tablets in 24 hours.    Call a doctor for any of the followin. Signs of infection (fever, growing tenderness at the surgery site, a large amount of drainage or bleeding, severe pain, foul-smelling drainage, redness, swelling).  2. It has been over 8 to 10 hours since surgery and you are still not able to urinate (pass water).  3. Headache for over 24 hours.  4. Signs of Covid-19 infection (temperature over 100 degrees, shortness of breath, cough, loss of taste/smell, generalized body aches, persistent headache, chills, sore throat, nausea/vomiting/diarrhea)  Your doctor is:  Dr. Kennedy Trinidad, Prostate and Urology: [phone]                  Or dial [phone] and ask for the resident on call for:  Prostate Urology  For emergency care, call the:  [facility]:  [phone] (TTY for hearing impaired: [phone])  [facility] Ambulatory Surgery [facility] Procedure [facility]  Home Care Following Anesthesia  For 24 hours after surgery:  10. Get plenty of rest.  A responsible adult must stay with you for at least 24 hours after you leave the surgery center.  11. Do not drive or use heavy equipment.  If you have weakness or tingling, don't drive or use heavy equipment until this feeling goes away.   12. Do not drink alcohol.   13. Avoid strenuous or risky activities.  Ask for help when climbing stairs.  14. You may feel lightheaded.  IF so,  sit for a few minutes before standing.  Have someone help you get up.   15. If you have nausea (feel sick to your stomach): Drink only clear liquids such as apple juice, ginger ale, broth or 7-Up.  Rest may also help.  Be sure to drink enough fluids.  Move to a regular diet as you feel able.   16. You may have a slight fever.  Call the doctor if your fever is over 100 F (37.7 C) (taken under the tongue) or lasts longer than 24 hours.  17. You may have a dry mouth, a sore throat, muscle aches or trouble sleeping. These should go away after 24 hours.  18. Do not make important or legal decisions.               Tips for taking pain medications  To get the best pain relief possible, remember these points:    Take pain medications as directed, before pain becomes severe.    Pain medication can upset your stomach: taking it with food may help.    Constipation is a common side effect of pain medication. Drink plenty of  fluids.    Eat foods high in fiber. Take a stool softener if recommended by your doctor or pharmacist.    Do not drink alcohol, drive or operate machinery while taking pain medications.    Ask about other ways to control pain, such as with heat, ice or relaxation.    Tylenol/Acetaminophen Consumption  To help encourage the safe use of acetaminophen, the makers of TYLENOL  have lowered the maximum daily dose for single-ingredient Extra Strength TYLENOL  (acetaminophen) products sold in the [place] from 8 pills per day (4,000 mg) to 6 pills per day (3,000 mg). The dosing interval has also changed from 2 pills every 4-6 hours to 2 pills every 6 hours.    If you feel your pain relief is insufficient, you may take Tylenol/Acetaminophen in addition to your narcotic pain medication.     Be careful not to exceed 3,000 mg of Tylenol/Acetaminophen in a 24 hour period from all sources.    If you are taking extra strength Tylenol/acetaminophen (500 mg), the maximum dose is 6 tablets in 24 hours.    If you are taking  regular strength acetaminophen (325 mg), the maximum dose is 9 tablets in 24 hours.    Call a doctor for any of the followin. Signs of infection (fever, growing tenderness at the surgery site, a large amount of drainage or bleeding, severe pain, foul-smelling drainage, redness, swelling).  6. It has been over 8 to 10 hours since surgery and you are still not able to urinate (pass water).  7. Headache for over 24 hours.  8. Signs of Covid-19 infection (temperature over 100 degrees, shortness of breath, cough, loss of taste/smell, generalized body aches, persistent headache, chills, sore throat, nausea/vomiting/diarrhea)  Your doctor is:  Dr. Kennedy Trinidad, Prostate and Urology: [phone]                  Or dial [phone] and ask for the resident on call for:  Prostate Urology  For emergency care, call the:  [facility]:  [phone] (TTY for hearing impaired: [phone])

## 2020-10-27 NOTE — ANESTHESIA POSTPROCEDURE EVALUATION
Anesthesia POST Procedure Evaluation    Patient: Parth Dugan   MRN:     [id number] Gender:   male   Age:    55 year old :      1965        Preoperative Diagnosis: Pain of testes [N50.819]  Bilateral varicoceles [I86.1]   Procedure(s):  BILATERAL VARICOCELE REPAIR   Postop Comments: No value filed.     Anesthesia Type: General       Disposition: Outpatient   Postop Pain Control: Uneventful            Sign Out: Well controlled pain   PONV: No   Neuro/Psych: Uneventful            Sign Out: Acceptable/Baseline neuro status   Airway/Respiratory: Uneventful            Sign Out: Acceptable/Baseline resp. status   CV/Hemodynamics: Uneventful            Sign Out: Acceptable CV status   Other NRE: NONE   DID A NON-ROUTINE EVENT OCCUR? No         Last Anesthesia Record Vitals:  CRNA VITALS  10/27/2020 1151 - 10/27/2020 1251      10/27/2020             Pulse:  87    SpO2:  96 %    Resp Rate (observed):  (!) 3          Last PACU Vitals:  Vitals Value Taken Time   /92 10/27/20 1304   Temp 36.4  C (97.5  F) 10/27/20 1304   Pulse 77 10/27/20 1304   Resp 16 10/27/20 1304   SpO2 95 % 10/27/20 1304   Temp src     NIBP     Pulse     SpO2     Resp     Temp     Ht Rate     Temp 2           Electronically Signed By: Raymundo Mcduffie MD, 2020, 2:36 PM

## 2020-10-27 NOTE — PRE-PROCEDURE
I reviewed his H and P done 9/25/20 by Dr. Gayle.  This remains valid and up to date.  No new changes in patient's health history sine this was done, other than he had his blood pressure medication increased.  He is voiding better after starting Flomax several weeks ago.      Joe BACK

## 2020-10-27 NOTE — ANESTHESIA PREPROCEDURE EVALUATION
Anesthesia Pre-Procedure Evaluation    Patient: Parth Dugan   MRN:     [id number] Gender:   male   Age:    55 year old :      1965        Preoperative Diagnosis: Pain of testes [N50.819]  Bilateral varicoceles [I86.1]   Procedure(s):  BILATERAL VARICOCELE REPAIR     LABS:  CBC: No results found for: WBC, HGB, HCT, PLT  BMP: No results found for: NA, POTASSIUM, CHLORIDE, CO2, BUN, CR, GLC  COAGS: No results found for: PTT, INR, FIBR  POC: No results found for: BGM, HCG, HCGS  OTHER: No results found for: PH, LACT, A1C, KYE, PHOS, MAG, ALBUMIN, PROTTOTAL, ALT, AST, GGT, ALKPHOS, BILITOTAL, BILIDIRECT, LIPASE, AMYLASE, MARIO ALBERTO, TSH, T4, T3, CRP, SED     Preop Vitals    BP Readings from Last 3 Encounters:   10/27/20 (!) 151/98   20 (!) 150/99    Pulse Readings from Last 3 Encounters:   10/27/20 70   20 63      Resp Readings from Last 3 Encounters:   10/27/20 18    SpO2 Readings from Last 3 Encounters:   10/27/20 98%      Temp Readings from Last 1 Encounters:   10/27/20 36.5  C (97.7  F) (Temporal)    Ht Readings from Last 1 Encounters:   10/27/20 1.829 m (6')      Wt Readings from Last 1 Encounters:   10/27/20 125.2 kg (276 lb)    Estimated body mass index is 37.43 kg/m  as calculated from the following:    Height as of this encounter: 1.829 m (6').    Weight as of this encounter: 125.2 kg (276 lb).     LDA:  Peripheral IV 10/27/20 Right Lower forearm (Active)   Site Assessment WDL 10/27/20 0706   Line Status Infusing 10/27/20 0706   Dressing Intervention New dressing  10/27/20 0706   Phlebitis Scale 0-->no symptoms 10/27/20 0706   Infiltration Scale 0 10/27/20 0706   Infiltration Site Treatment Method  None 10/27/20 0706   Number of days: 0       ETT Cuffed Single 8 mm (Active)   Number of days: 0        History reviewed. No pertinent past medical history.   History reviewed. No pertinent surgical history.   Allergies   Allergen Reactions     Hydrochlorothiazide Other (See Comments)     Repeated  gout flares     Iodine Nausea     Lisinopril Hives     Amoxicillin Rash     Indomethacin      Other reaction(s): Abdominal pain  Likely reflux/gastritis from NSAID use. Has tolerated other NSAIDs.     Penicillins Itching and Rash        Anesthesia Evaluation     .             ROS/MED HX    ENT/Pulmonary:     (+)tobacco use, Past use , . .    Neurologic:       Cardiovascular:     (+) Dyslipidemia, hypertension----. : . . . :. .       METS/Exercise Tolerance:     Hematologic:         Musculoskeletal:         GI/Hepatic:     (+) GERD Asymptomatic on medication,       Renal/Genitourinary:         Endo:         Psychiatric: Comment: Cannabis use        Infectious Disease:         Malignancy:         Other:    (+) H/O Chronic Pain,H/O chronic opiod use ,                        PHYSICAL EXAM:   Mental Status/Neuro: A/A/O   Airway: Facies: Feasible  Mallampati: II  Mouth/Opening: Full  TM distance: > 6 cm  Neck ROM: Full   Respiratory:   Resp. Rate: Normal     Resp. Effort: Normal      CV:    Comments:      Dental: Normal Dentition                Assessment:   ASA SCORE: 2    H&P: History and physical reviewed and following examination; no interval change.   Smoking Status:  Non-Smoker/Unknown   NPO Status: NPO Appropriate     Plan:   Anes. Type:  General   Pre-Medication: None   Induction:  IV (Standard)   Airway: ETT; Oral   Access/Monitoring: PIV   Maintenance: Balanced     Postop Plan:   Postop Pain: Opioids  Postop Sedation/Airway: Not planned     PONV Management:   Adult Risk Factors:, Non-Smoker, Postop Opioids   Prevention: Ondansetron, Dexamethasone, Propofol     CONSENT: Direct conversation   Plan and risks discussed with: Patient          Comments for Plan/Consent:  General LMA was the plan, however iGel LMA didn't seat properly due to soft tissue and switched to ETT                 Raymundo Mcduffie MD

## 2020-10-27 NOTE — ANESTHESIA CARE TRANSFER NOTE
Patient: Parth Dugan    Procedure(s):  BILATERAL VARICOCELE REPAIR    Diagnosis: Pain of testes [N50.819]  Bilateral varicoceles [I86.1]  Diagnosis Additional Information: No value filed.    Anesthesia Type:   General     Note:  Airway :Room Air  Patient transferred to:PACU  Comments: John Report: Identifed the Patient, Identified the Reponsible Provider, Reviewed the pertinent medical history, Discussed the surgical course, Reviewed Intra-OP anesthesia mangement and issues during anesthesia, Set expectations for post-procedure period and Allowed opportunity for questions and acknowledgement of understanding      Vitals: (Last set prior to Anesthesia Care Transfer)    CRNA VITALS  10/27/2020 1151 - 10/27/2020 1222      10/27/2020             Pulse:  87    SpO2:  96 %    Resp Rate (observed):  (!) 3                Electronically Signed By: NEYDA Torrez CRNA  October 27, 2020  12:22 PM

## 2020-10-28 ENCOUNTER — DOCUMENTATION ONLY (OUTPATIENT)
Dept: CARE COORDINATION | Facility: CLINIC | Age: 55
End: 2020-10-28

## 2020-11-23 ENCOUNTER — PRE VISIT (OUTPATIENT)
Dept: UROLOGY | Facility: CLINIC | Age: 55
End: 2020-11-23

## 2020-11-23 NOTE — TELEPHONE ENCOUNTER
Reason for Visit: post operative check up S/P bilateral varicocelectomy using microscope    Diagnosis: Varicocele    Orders/Procedures/Records: in system    Contact Patient: n/a    Rooming Requirements: yandel Nickerson LPN  11/23/20  1:00 PM

## 2020-12-04 ENCOUNTER — VIRTUAL VISIT (OUTPATIENT)
Dept: UROLOGY | Facility: CLINIC | Age: 55
End: 2020-12-04
Payer: COMMERCIAL

## 2020-12-04 DIAGNOSIS — I86.1 BILATERAL VARICOCELES: Primary | ICD-10-CM

## 2020-12-04 PROCEDURE — 99024 POSTOP FOLLOW-UP VISIT: CPT | Mod: 95 | Performed by: UROLOGY

## 2020-12-04 NOTE — PROGRESS NOTES
"Parth Dugan is a 55 year old male who is being evaluated via a billable telephone visit.      The patient has been notified of following:     \"This telephone visit will be conducted via a call between you and your physician/provider. We have found that certain health care needs can be provided without the need for a physical exam.  This service lets us provide the care you need with a short phone conversation.  If a prescription is necessary we can send it directly to your pharmacy.  If lab work is needed we can place an order for that and you can then stop by our lab to have the test done at a later time.    Telephone visits are billed at different rates depending on your insurance coverage. During this emergency period, for some insurers they may be billed the same as an in-person visit.  Please reach out to your insurance provider with any questions.    If during the course of the call the physician/provider feels a telephone visit is not appropriate, you will not be charged for this service.\"    Patient has given verbal consent for Telephone visit?  Yes    What phone number would you like to be contacted at? [phone]    How would you like to obtain your AVS? Mail a copy    Phone call duration: 3 minutes, ending 10:05 AM         CC: Parth Dugan is post-op from bilateral varicocele repair  done 10/27/20.    HPI: Patient is 5 wks post-op.  he has been doing well. No fevers or chills. Pain decreasing.   He feels his back pain that he had has been feeling much better over the last month.  Pain he had the varicocele surgery for is improved, it sounds like.    Exam:   Exam deferred over the phone.  Attitude and demeanor is pleasant, alert & oriented and conversant.    No gross thought process defects during casual conversation.     Respirations normal, nonlabored.    Assessment  - status-post uncomplicated bilateral varicocele repair.    PLAN:     Activity as tolerated.    Continue Flomax, this is " helping him to void.  He can renew with his PCP.     PRN follow-up with urology.        Joe BACK

## 2020-12-04 NOTE — NURSING NOTE
Chief Complaint   Patient presents with     Surgical Followup     post operative check up S/P bilateral varicocelectomy using microscope   - Zulay Plascencia,   EMT Clinic Support

## 2020-12-04 NOTE — LETTER
"12/4/2020       RE: Parth Dugan  [address]  [place] [address]     Dear Colleague,    Thank you for referring your patient, Parth Dugan, to the [facility]OLOG[facility] [place] at [facility]. Please see a copy of my visit note below.    Parth Dugan is a 55 year old male who is being evaluated via a billable telephone visit.      The patient has been notified of following:     \"This telephone visit will be conducted via a call between you and your physician/provider. We have found that certain health care needs can be provided without the need for a physical exam.  This service lets us provide the care you need with a short phone conversation.  If a prescription is necessary we can send it directly to your pharmacy.  If lab work is needed we can place an order for that and you can then stop by our lab to have the test done at a later time.    Telephone visits are billed at different rates depending on your insurance coverage. During this emergency period, for some insurers they may be billed the same as an in-person visit.  Please reach out to your insurance provider with any questions.    If during the course of the call the physician/provider feels a telephone visit is not appropriate, you will not be charged for this service.\"    Patient has given verbal consent for Telephone visit?  Yes    What phone number would you like to be contacted at? [phone]    How would you like to obtain your AVS? Mail a copy    Phone call duration: 3 minutes, ending 10:05 AM         CC: Parth Dugan is post-op from bilateral varicocele repair  done 10/27/20.    HPI: Patient is 5 wks post-op.  he has been doing well. No fevers or chills. Pain decreasing.   He feels his back pain that he had has been feeling much better over the last month.  Pain he had the varicocele surgery for is improved, it sounds like.    Exam:   Exam deferred over the " phone.  Attitude and demeanor is pleasant, alert & oriented and conversant.    No gross thought process defects during casual conversation.     Respirations normal, nonlabored.    Assessment  - status-post uncomplicated bilateral varicocele repair.    PLAN:     Activity as tolerated.    Continue Flomax, this is helping him to void.  He can renew with his PCP.     PRN follow-up with urology.        Joe BACK

## 2021-04-08 ENCOUNTER — PRE VISIT (OUTPATIENT)
Dept: UROLOGY | Facility: CLINIC | Age: 56
End: 2021-04-08

## 2021-04-08 NOTE — TELEPHONE ENCOUNTER
Reason for visit: Consult    Relevant information: Varicocele; s/p varicocelectomy    Records/imaging/labs/orders: in EPIC    Pt called: no    At Rooming: normal

## 2021-04-22 ENCOUNTER — OFFICE VISIT (OUTPATIENT)
Dept: UROLOGY | Facility: CLINIC | Age: 56
End: 2021-04-22
Payer: COMMERCIAL

## 2021-04-22 VITALS
WEIGHT: 260 LBS | DIASTOLIC BLOOD PRESSURE: 97 MMHG | HEIGHT: 72 IN | SYSTOLIC BLOOD PRESSURE: 141 MMHG | HEART RATE: 67 BPM | BODY MASS INDEX: 35.21 KG/M2

## 2021-04-22 DIAGNOSIS — R39.198 SLOW URINARY STREAM: ICD-10-CM

## 2021-04-22 DIAGNOSIS — N40.1 BENIGN LOCALIZED PROSTATIC HYPERPLASIA WITH LOWER URINARY TRACT SYMPTOMS (LUTS): ICD-10-CM

## 2021-04-22 DIAGNOSIS — E66.01 MORBID OBESITY (H): ICD-10-CM

## 2021-04-22 DIAGNOSIS — N50.811 PAIN IN BOTH TESTICLES: ICD-10-CM

## 2021-04-22 DIAGNOSIS — N52.9 ERECTILE DYSFUNCTION, UNSPECIFIED ERECTILE DYSFUNCTION TYPE: Primary | ICD-10-CM

## 2021-04-22 DIAGNOSIS — N50.812 PAIN IN BOTH TESTICLES: ICD-10-CM

## 2021-04-22 PROCEDURE — 99214 OFFICE O/P EST MOD 30 MIN: CPT | Performed by: UROLOGY

## 2021-04-22 RX ORDER — SILDENAFIL 100 MG/1
50-100 TABLET, FILM COATED ORAL DAILY PRN
Qty: 20 TABLET | Refills: 11 | Status: SHIPPED | OUTPATIENT
Start: 2021-04-22

## 2021-04-22 RX ORDER — PREDNISONE 20 MG/1
20 TABLET ORAL DAILY
COMMUNITY
Start: 2021-03-30

## 2021-04-22 RX ORDER — TAMSULOSIN HYDROCHLORIDE 0.4 MG/1
0.4 CAPSULE ORAL DAILY
Qty: 90 CAPSULE | Refills: 3 | Status: SHIPPED | OUTPATIENT
Start: 2021-04-22 | End: 2022-06-16

## 2021-04-22 ASSESSMENT — MIFFLIN-ST. JEOR: SCORE: 2057.35

## 2021-04-22 ASSESSMENT — PAIN SCALES - GENERAL: PAINLEVEL: SEVERE PAIN (7)

## 2021-04-22 NOTE — LETTER
4/22/2021       RE: Parth Dugan  [address]  [place] [address]     Dear Colleague,    Thank you for referring your patient, Parth Dugan, to the [facility]OLOG[facility] [place] at [facility]. Please see a copy of my visit note below.    HPI:  Parth Dugan is a 56 year old male being seen for   Bilateral testis pain.  Status-post bilateral varicocele repair for pain 10/27/20.  He initially felt better post-op, Dec 2020.    Takes oxycodone for pain- has a pain clinic for this.    Feels worse pain after intercourse.    Also trouble maintaining erection.  When does get an erection- has severe pain.    Has been using alpha-blocker for voiding, out of this so has increased trouble voiding.       Exam:  Physical Exam  BP (!) 141/97   Pulse 67   Ht 1.829 m (6')   Wt 117.9 kg (260 lb)   BMI 35.26 kg/m      General: Alert, oriented, nad.  Pleasant and conversant.  Eyes: anicteric, EOMI.  Pulse: regular  Resps: normal, non-labored.  Abdomen:  Nondistended.  Neurological - no tremors  Skin - no discoloration/ lesions noted   EXAM:  Phallus normal   Left testis descended , size is normal  , consistency is normal . No intra-testicular masses.   Right testis descended , size is normal  , consistency is normal . No intra-testicular masses.   Epididymes present, non-tender, not-enlarged.   Cord structures not remarkable.   No varicocele noted on today's exam.    Review of Imaging:  The following imaging exams were independently viewed and interpreted by me and discussed with patient:  Scrotal ultrasound 8/24/2020:                                                                 Impression: Bilateral varicoceles. Otherwise normal testicular  ultrasound.    Review of Labs:  The following labs were reviewed by me and discussed with the patient: None    ASSESSMENT/PLAN  Bilateral testis pain that seems to be chronic neuropathic pain.    He had  a bilateral varicocele repair 10/27/2020, which transiently helped his pain, but it was back shortly.  I could remove the testicle, really as the only surgical option I would have for him.  The severity of his pain does not warrant this.  I recommended follow-up with the pain clinic, I do not have other your surgical options.    BPH with slow urinary stream  -Renew Flomax prescription    ED  Renew Viagra prescription    Morbid obesity  Body mass index is 35.26 kg/m .     Follow-up 1 year as needed    Kennedy Trinidad MD  [facility] [place]      ==========================    Additional Billing and Coding Information:  30 minutes spent on the date of the encounter doing chart review, history and exam, documentation and further activities as noted above        ==========================

## 2021-04-22 NOTE — PROGRESS NOTES
HPI:  Parth Dugan is a 56 year old male being seen for   Bilateral testis pain.  Status-post bilateral varicocele repair for pain 10/27/20.  He initially felt better post-op, Dec 2020.    Takes oxycodone for pain- has a pain clinic for this.    Feels worse pain after intercourse.    Also trouble maintaining erection.  When does get an erection- has severe pain.    Has been using alpha-blocker for voiding, out of this so has increased trouble voiding.       Exam:  Physical Exam  BP (!) 141/97   Pulse 67   Ht 1.829 m (6')   Wt 117.9 kg (260 lb)   BMI 35.26 kg/m      General: Alert, oriented, nad.  Pleasant and conversant.  Eyes: anicteric, EOMI.  Pulse: regular  Resps: normal, non-labored.  Abdomen:  Nondistended.  Neurological - no tremors  Skin - no discoloration/ lesions noted   EXAM:  Phallus normal   Left testis descended , size is normal  , consistency is normal . No intra-testicular masses.   Right testis descended , size is normal  , consistency is normal . No intra-testicular masses.   Epididymes present, non-tender, not-enlarged.   Cord structures not remarkable.   No varicocele noted on today's exam.    Review of Imaging:  The following imaging exams were independently viewed and interpreted by me and discussed with patient:  Scrotal ultrasound 8/24/2020:                                                                 Impression: Bilateral varicoceles. Otherwise normal testicular  ultrasound.    Review of Labs:  The following labs were reviewed by me and discussed with the patient: None    ASSESSMENT/PLAN  Bilateral testis pain that seems to be chronic neuropathic pain.    He had a bilateral varicocele repair 10/27/2020, which transiently helped his pain, but it was back shortly.  I could remove the testicle, really as the only surgical option I would have for him.  The severity of his pain does not warrant this.  I recommended follow-up with the pain clinic, I do not have other your surgical  options.    BPH with slow urinary stream  -Renew Flomax prescription    ED  Renew Viagra prescription    Morbid obesity  Body mass index is 35.26 kg/m .     Follow-up 1 year as needed    Kennedy Trinidad MD  [facility] UROLOGY [facility] [place]      ==========================    Additional Billing and Coding Information:  30 minutes spent on the date of the encounter doing chart review, history and exam, documentation and further activities as noted above        ==========================

## 2021-04-22 NOTE — NURSING NOTE
Chief Complaint   Patient presents with     Follow Up     varicocele; s/p bilateral varicocelectomy       Height 1.829 m (6'), weight 117.9 kg (260 lb). Body mass index is 35.26 kg/m .    Patient Active Problem List   Diagnosis     Cannabis abuse     Chondromalacia of left knee     Chronic low back pain     Chronic pain disorder     GERD (gastroesophageal reflux disease)     Hyperlipidemia     Hypertension, goal below 140/90     Idiopathic chronic gout of multiple sites without tophus     Other secondary chronic gout, unspecified site, with tophus (tophi)     Preventative health care     Pain of testes     Bilateral varicoceles       Allergies   Allergen Reactions     Hydrochlorothiazide Other (See Comments)     Repeated gout flares     Iodine Nausea     Lisinopril Hives     Amoxicillin Rash     Indomethacin      Other reaction(s): Abdominal pain  Likely reflux/gastritis from NSAID use. Has tolerated other NSAIDs.     Penicillins Itching and Rash       Current Outpatient Medications   Medication Sig Dispense Refill     allopurinol (ZYLOPRIM) 100 MG tablet TAKE 2 TABLETS (200 MG) BY MOUTH ONCE DAILY.       amLODIPine (NORVASC) 10 MG tablet TAKE 1 TABLET (10 MG) BY MOUTH ONCE DAILY.       atorvastatin (LIPITOR) 20 MG tablet Take 20 mg by mouth daily       colchicine (COLCRYS) 0.6 MG tablet 1 tablet       hydrocortisone 2.5 % cream MIX 1:1 WITH KETOCONAZOLE CREAM AND APPLY TO AFFECTED AREAS 2 TIMES A DAY FOR UP TO 14 DAYS       ketoconazole (NIZORAL) 2 % external cream MIX 1:1 WITH HYDROCORTISONE AND APPLY 2 TIMES A DAY TO AFFECTED AREAS FOR UP TO 14 DAYS       lidocaine (XYLOCAINE) 5 % external ointment apply 5 gram by topical route 2 times every day       losartan (COZAAR) 50 MG tablet Take 50 mg by mouth       Multiple Vitamin (DAILY VITAMINS) TABS Take 1 tablet by mouth       NARCAN 4 MG/0.1ML nasal spray spray 0.1 milliliter by intranasal route in 1 nostril may repeat dose every 2-3 minutes as needed alternating  nostrils with each dose       omeprazole (PRILOSEC) 20 MG DR capsule TAKE 2 CAPSULES BY MOUTH EVERY DAY       oxyCODONE (ROXICODONE) 5 MG tablet Take 1 tablet (5 mg) by mouth every 6 hours as needed for severe pain 15 tablet 0     SENNA-docusate sodium (SENNA S) 8.6-50 MG tablet Take 1 tablet by mouth 2 times daily 39 tablet 0     tamsulosin (FLOMAX) 0.4 MG capsule Take 1 capsule (0.4 mg) by mouth daily 90 capsule 3     triamterene-HCTZ (DYAZIDE) 37.5-25 MG capsule TAKE 1 CAPSULE BY MOUTH EVERY DAY       predniSONE (DELTASONE) 20 MG tablet Take 20 mg by mouth daily         Social History     Tobacco Use     Smoking status: Former Smoker     Smokeless tobacco: Never Used   Substance Use Topics     Alcohol use: None     Drug use: None       Rm Negro EMT  4/22/2021  1:15 PM

## 2021-04-25 PROBLEM — E66.01 MORBID OBESITY (H): Status: ACTIVE | Noted: 2021-04-25

## 2022-06-13 DIAGNOSIS — R39.198 SLOW URINARY STREAM: ICD-10-CM

## 2022-06-16 NOTE — TELEPHONE ENCOUNTER
tamsulosin (FLOMAX) 0.4 MG capsule  Last Written Prescription Date: 4/22/21  Last Fill Quantity: 90,   # refills: 3  Last Office Visit : 4/22/21  Future Office visit: none      Follow-up 1 year as needed    Scheduling has been notified to contact the pt for appointment.    Routing refill request to provider for review/approval because:  Alpha Blockers Failed    Protocol Details  Blood pressure under 140/90 in past 12 months    Recent (12 mo) or future (30 days) visit within the authorizing provider's specialty    Patient does not have Tadalafil, Vardenafil, or Sildenafil on their medication list

## 2022-06-21 RX ORDER — TAMSULOSIN HYDROCHLORIDE 0.4 MG/1
0.4 CAPSULE ORAL DAILY
Qty: 90 CAPSULE | Refills: 0 | Status: SHIPPED | OUTPATIENT
Start: 2022-06-21 | End: 2022-10-11

## 2022-07-12 ENCOUNTER — PRE VISIT (OUTPATIENT)
Dept: UROLOGY | Facility: CLINIC | Age: 57
End: 2022-07-12

## 2022-07-12 NOTE — TELEPHONE ENCOUNTER
Reason for visit: Follow up    Relevant information: varicoceles; refill flomax    Records/imaging/labs/orders: in EPIC    Pt called: no    At Rooming: normal

## 2022-09-30 ENCOUNTER — OFFICE VISIT (OUTPATIENT)
Dept: UROLOGY | Facility: CLINIC | Age: 57
End: 2022-09-30
Payer: COMMERCIAL

## 2022-09-30 VITALS
DIASTOLIC BLOOD PRESSURE: 77 MMHG | BODY MASS INDEX: 34.27 KG/M2 | HEART RATE: 72 BPM | WEIGHT: 253 LBS | HEIGHT: 72 IN | SYSTOLIC BLOOD PRESSURE: 154 MMHG

## 2022-09-30 DIAGNOSIS — N50.812 PAIN IN BOTH TESTICLES: ICD-10-CM

## 2022-09-30 DIAGNOSIS — N50.811 PAIN IN BOTH TESTICLES: ICD-10-CM

## 2022-09-30 DIAGNOSIS — R10.2 PELVIC PAIN IN MALE: Primary | ICD-10-CM

## 2022-09-30 PROCEDURE — 99213 OFFICE O/P EST LOW 20 MIN: CPT | Performed by: UROLOGY

## 2022-09-30 ASSESSMENT — PAIN SCALES - GENERAL: PAINLEVEL: MODERATE PAIN (4)

## 2022-09-30 NOTE — LETTER
9/30/2022       RE: Parth Dugan  [address]  [place] [address]     Dear Colleague,    Thank you for referring your patient, Parth Dugan, to the [facility] [place] at [facility]. Please see a copy of my visit note below.    HPI:  Parth Dugan is a 57 year old male being seen for bilateral testis pain     He had ER visit 9/21/22 at [facility] for the pain.      Exam:  Physical Exam  BP (!) 154/77   Pulse 72   Ht 1.829 m (6')   Wt 114.8 kg (253 lb)   BMI 34.31 kg/m      General: Alert, oriented, nad.  Pleasant and conversant.  Eyes: anicteric, EOMI.  Pulse: regular  Resps: normal, non-labored.  Abdomen:  Nondistended.  Neurological - no tremors  Skin - no discoloration/ lesions noted   exam   Phallus normal   Testes ++, anodular, nontender.  Vas and epididymis present and normal bilaterally  no varicocele noted.  EILEEN deferred.     Review of Imaging:  The following imaging exams were independently viewed and interpreted by me and discussed with patient:  Scrotal ultrasound 8/24/2020:   Impression: Bilateral varicoceles. Otherwise normal testicular ultrasound.    Review of Labs:  The following labs were reviewed by me and discussed with the patient:  No results found for this or any previous visit (from the past 720 hour(s)).    Assessment & Plan   1. Bilateral testis pain.  a. Varicoceles resolved.  No physical problem seen on testes on physical exam.  I do not recommend surgery or any new prescription medication.  He does have a pain clinic he follows with also.  b. Pelvic floor physical therapy referral.  c. PRN follow-up with urology.      Kennedy Trinidad MD  [facility] [place]      ==========================      Additional Coding Information:    Problems:  3 -- one stable chronic illness    Data Reviewed  Scrotal ultrasound.    Tests ordered: N/A     Level of risk:  3 -- low risk (e.g.,  OTC medication or observation, minor surgery without risks)    Time spent:  11 minutes spent on the date of the encounter doing chart review, history and exam, documentation and further activities per the note

## 2022-09-30 NOTE — NURSING NOTE
Chief Complaint   Patient presents with     Follow Up     varicocele       Height 1.829 m (6'), weight 114.8 kg (253 lb). Body mass index is 34.31 kg/m .    Patient Active Problem List   Diagnosis     Cannabis abuse     Chondromalacia of left knee     Chronic low back pain     Chronic pain disorder     GERD (gastroesophageal reflux disease)     Hyperlipidemia     Hypertension, goal below 140/90     Idiopathic chronic gout of multiple sites without tophus     Other secondary chronic gout, unspecified site, with tophus (tophi)     Preventative health care     Pain of testes     Bilateral varicoceles     Morbid obesity (H)       Allergies   Allergen Reactions     Hydrochlorothiazide Other (See Comments)     Repeated gout flares     Iodine Nausea     Lisinopril Hives     Amoxicillin Rash     Indomethacin      Other reaction(s): Abdominal pain  Likely reflux/gastritis from NSAID use. Has tolerated other NSAIDs.     Penicillins Itching and Rash       Current Outpatient Medications   Medication Sig Dispense Refill     allopurinol (ZYLOPRIM) 100 MG tablet TAKE 2 TABLETS (200 MG) BY MOUTH ONCE DAILY.       amLODIPine (NORVASC) 10 MG tablet TAKE 1 TABLET (10 MG) BY MOUTH ONCE DAILY.       atorvastatin (LIPITOR) 20 MG tablet Take 20 mg by mouth daily       colchicine (COLCYRS) 0.6 MG tablet 1 tablet       hydrocortisone 2.5 % cream MIX 1:1 WITH KETOCONAZOLE CREAM AND APPLY TO AFFECTED AREAS 2 TIMES A DAY FOR UP TO 14 DAYS       ketoconazole (NIZORAL) 2 % external cream MIX 1:1 WITH HYDROCORTISONE AND APPLY 2 TIMES A DAY TO AFFECTED AREAS FOR UP TO 14 DAYS       lidocaine (XYLOCAINE) 5 % external ointment apply 5 gram by topical route 2 times every day       losartan (COZAAR) 50 MG tablet Take 50 mg by mouth       Multiple Vitamin (DAILY VITAMINS) TABS Take 1 tablet by mouth       NARCAN 4 MG/0.1ML nasal spray spray 0.1 milliliter by intranasal route in 1 nostril may repeat dose every 2-3 minutes as needed alternating nostrils  with each dose       omeprazole (PRILOSEC) 20 MG DR capsule TAKE 2 CAPSULES BY MOUTH EVERY DAY       oxyCODONE (ROXICODONE) 5 MG tablet Take 1 tablet (5 mg) by mouth every 6 hours as needed for severe pain 15 tablet 0     predniSONE (DELTASONE) 20 MG tablet Take 20 mg by mouth daily       SENNA-docusate sodium (SENNA S) 8.6-50 MG tablet Take 1 tablet by mouth 2 times daily 39 tablet 0     sildenafil (VIAGRA) 100 MG tablet Take 0.5-1 tablets ( mg) by mouth daily as needed (take 1 hour before intercourse) 20 tablet 11     tamsulosin (FLOMAX) 0.4 MG capsule Take 1 capsule (0.4 mg) by mouth daily Call clinic to schedule follow up appointment. 90 capsule 0     triamterene-HCTZ (DYAZIDE) 37.5-25 MG capsule TAKE 1 CAPSULE BY MOUTH EVERY DAY         Social History     Tobacco Use     Smoking status: Former Smoker     Smokeless tobacco: Never Used       Rm RICHARDSON  9/30/2022  11:36 AM

## 2022-09-30 NOTE — PROGRESS NOTES
HPI:  Parth Dugan is a 57 year old male being seen for bilateral testis pain     He had ER visit 9/21/22 at [facility] for the pain.      Exam:  Physical Exam  BP (!) 154/77   Pulse 72   Ht 1.829 m (6')   Wt 114.8 kg (253 lb)   BMI 34.31 kg/m      General: Alert, oriented, nad.  Pleasant and conversant.  Eyes: anicteric, EOMI.  Pulse: regular  Resps: normal, non-labored.  Abdomen:  Nondistended.  Neurological - no tremors  Skin - no discoloration/ lesions noted   exam   Phallus normal   Testes ++, anodular, nontender.  Vas and epididymis present and normal bilaterally  no varicocele noted.  EILEEN deferred.     Review of Imaging:  The following imaging exams were independently viewed and interpreted by me and discussed with patient:  Scrotal ultrasound 8/24/2020:   Impression: Bilateral varicoceles. Otherwise normal testicular ultrasound.    Review of Labs:  The following labs were reviewed by me and discussed with the patient:  No results found for this or any previous visit (from the past 720 hour(s)).    Assessment & Plan   1. Bilateral testis pain.  a. Varicoceles resolved.  No physical problem seen on testes on physical exam.  I do not recommend surgery or any new prescription medication.  He does have a pain clinic he follows with also.  b. Pelvic floor physical therapy referral.  c. PRN follow-up with urology.      Kennedy Trinidad MD  [facility]OLOG[facility] [place]      ==========================      Additional Coding Information:    Problems:  3 -- one stable chronic illness    Data Reviewed  Scrotal ultrasound.    Tests ordered: N/A     Level of risk:  3 -- low risk (e.g., OTC medication or observation, minor surgery without risks)    Time spent:  11 minutes spent on the date of the encounter doing chart review, history and exam, documentation and further activities per the note

## 2022-10-06 DIAGNOSIS — R39.198 SLOW URINARY STREAM: ICD-10-CM

## 2022-10-11 NOTE — TELEPHONE ENCOUNTER
tamsulosin (FLOMAX) 0.4 MG    Last Written Prescription Date:  6/21/22  Last Fill Quantity: 90,   # refills: 0  Last Office Visit : 9/30/22  Future Office visit:  NONE  Routing refill request to provider for review/approval because:  MED OVER RIDE

## 2022-10-24 RX ORDER — TAMSULOSIN HYDROCHLORIDE 0.4 MG/1
0.4 CAPSULE ORAL DAILY
Qty: 90 CAPSULE | Refills: 3 | Status: SHIPPED | OUTPATIENT
Start: 2022-10-24 | End: 2023-10-30

## 2023-09-03 DIAGNOSIS — R39.198 SLOW URINARY STREAM: ICD-10-CM

## 2023-09-05 NOTE — TELEPHONE ENCOUNTER
tamsulosin (FLOMAX) 0.4 MG capsule       90 capsule 3 10/24/2022     Last Office Visit : 9-  Future Office visit:    none    Alpha Blockers [id number]/2023 06:29 AM   Protocol Details Blood pressure under 140/90 in past 12 months    Patient does not have Tadalafil, Vardenafil, or Sildenafil on their medication list     BP Readings from Last 3 Encounters:   09/30/22 (!) 154/77   04/22/21 (!) 141/97   10/27/20 131/86

## 2023-10-05 RX ORDER — TAMSULOSIN HYDROCHLORIDE 0.4 MG/1
0.4 CAPSULE ORAL DAILY
Qty: 90 CAPSULE | Refills: 3 | OUTPATIENT
Start: 2023-10-05

## 2023-10-26 DIAGNOSIS — R39.198 SLOW URINARY STREAM: ICD-10-CM

## 2023-10-30 RX ORDER — TAMSULOSIN HYDROCHLORIDE 0.4 MG/1
0.4 CAPSULE ORAL DAILY
Qty: 30 CAPSULE | Refills: 0 | Status: SHIPPED | OUTPATIENT
Start: 2023-10-30 | End: 2023-12-06

## 2023-11-21 DIAGNOSIS — R39.198 SLOW URINARY STREAM: ICD-10-CM

## 2023-11-27 NOTE — TELEPHONE ENCOUNTER
tamsulosin (FLOMAX) 0.4 MG capsule     30 capsule 0 10/30/2023     Last Office Visit : 9-  Future Office visit:  12-      Alpha Blockers [id number]/21/2023 07:30 AM   Protocol Details Blood pressure under 140/90 in past 12 months    Recent (12 mo) or future (30 days) visit within the authorizing provider's specialty    Patient does not have Tadalafil, Vardenafil, or Sildenafil on their medication list     BP Readings from Last 3 Encounters:   09/30/22 (!) 154/77   04/22/21 (!) 141/97   10/27/20 131/86

## 2023-12-06 RX ORDER — TAMSULOSIN HYDROCHLORIDE 0.4 MG/1
0.4 CAPSULE ORAL DAILY
Qty: 30 CAPSULE | Refills: 1 | Status: SHIPPED | OUTPATIENT
Start: 2023-12-06

## 2023-12-21 ENCOUNTER — PRE VISIT (OUTPATIENT)
Dept: UROLOGY | Facility: CLINIC | Age: 58
End: 2023-12-21
Payer: COMMERCIAL

## 2023-12-21 NOTE — CONFIDENTIAL NOTE
Reason for visit: medication refill - Flomax (tamsulosin)      Relevant information: slowing of urinary stream    Records/imaging/labs/orders: in epic    At Rooming: shauna Reilly  12/21/2023  8:22 AM

## (undated) DEVICE — SU MONOCRYL 4-0 PS-2 18" UND Y496G

## (undated) DEVICE — SYR 20ML LL W/O NDL 302830

## (undated) DEVICE — DRSG STERI STRIP 1/2X4" R1547

## (undated) DEVICE — GLOVE PROTEXIS W/NEU-THERA 7.5  2D73TE75

## (undated) DEVICE — SYR 03ML LL W/O NDL 309657

## (undated) DEVICE — PREP CHLORAPREP 26ML TINTED ORANGE  260815

## (undated) DEVICE — BLADE CLIPPER SGL USE 9680

## (undated) DEVICE — NDL ANGIOCATH 20GA 1.25" 4056

## (undated) DEVICE — DRSG PRIMAPORE 02X3" 7133

## (undated) DEVICE — ESU GROUND PAD ADULT W/CORD E7507

## (undated) DEVICE — SU SILK 4-0 TIE 12X30" A303H

## (undated) DEVICE — SU CHROMIC 3-0 SH 27" G122H

## (undated) DEVICE — DRAPE MICROSCOPE LEICA 54X150" AR8033650

## (undated) DEVICE — LINEN TOWEL PACK X5 5464

## (undated) DEVICE — NDL BLUNT 18GA 1" W/O FILTER 305181

## (undated) DEVICE — PROBE DOPPLER STR VTI 20MHZ DISP 108200

## (undated) DEVICE — VESSEL LOOPS RED MINI 31145710

## (undated) DEVICE — ESU CORD BIPOLAR GREEN 10-4000

## (undated) DEVICE — NDL ANGIOCATH 14GA 1.25" 3068

## (undated) DEVICE — SU SILK 2-0 TIE 12X30" A305H

## (undated) DEVICE — ESU ELEC NDL 1" COATED/INSULATED E1465

## (undated) DEVICE — PACK MINOR CUSTOM ASC

## (undated) DEVICE — DRAPE LAP TRANSVERSE 29421

## (undated) DEVICE — VESSEL LOOPS BLUE MINI

## (undated) DEVICE — SOL BENZOIN 0.5OZ

## (undated) DEVICE — SOL NACL 0.9% IRRIG 500ML BOTTLE 2F7123

## (undated) RX ORDER — FENTANYL CITRATE 50 UG/ML
INJECTION, SOLUTION INTRAMUSCULAR; INTRAVENOUS
Status: DISPENSED
Start: 2020-10-27

## (undated) RX ORDER — PROPOFOL 10 MG/ML
INJECTION, EMULSION INTRAVENOUS
Status: DISPENSED
Start: 2020-10-27

## (undated) RX ORDER — PAPAVERINE HYDROCHLORIDE 30 MG/ML
INJECTION INTRAMUSCULAR; INTRAVENOUS
Status: DISPENSED
Start: 2020-10-27

## (undated) RX ORDER — DEXAMETHASONE SODIUM PHOSPHATE 4 MG/ML
INJECTION, SOLUTION INTRA-ARTICULAR; INTRALESIONAL; INTRAMUSCULAR; INTRAVENOUS; SOFT TISSUE
Status: DISPENSED
Start: 2020-10-27

## (undated) RX ORDER — LIDOCAINE HYDROCHLORIDE 20 MG/ML
INJECTION, SOLUTION EPIDURAL; INFILTRATION; INTRACAUDAL; PERINEURAL
Status: DISPENSED
Start: 2020-10-27

## (undated) RX ORDER — BUPIVACAINE HYDROCHLORIDE 5 MG/ML
INJECTION, SOLUTION EPIDURAL; INTRACAUDAL
Status: DISPENSED
Start: 2020-10-27

## (undated) RX ORDER — HYDROMORPHONE HYDROCHLORIDE 1 MG/ML
INJECTION, SOLUTION INTRAMUSCULAR; INTRAVENOUS; SUBCUTANEOUS
Status: DISPENSED
Start: 2020-10-27

## (undated) RX ORDER — ONDANSETRON 2 MG/ML
INJECTION INTRAMUSCULAR; INTRAVENOUS
Status: DISPENSED
Start: 2020-10-27

## (undated) RX ORDER — FENTANYL CITRATE-0.9 % NACL/PF 10 MCG/ML
PLASTIC BAG, INJECTION (ML) INTRAVENOUS
Status: DISPENSED
Start: 2020-10-27